# Patient Record
Sex: MALE
[De-identification: names, ages, dates, MRNs, and addresses within clinical notes are randomized per-mention and may not be internally consistent; named-entity substitution may affect disease eponyms.]

---

## 2020-06-30 PROBLEM — Z00.00 ENCOUNTER FOR PREVENTIVE HEALTH EXAMINATION: Status: ACTIVE | Noted: 2020-06-30

## 2020-07-01 ENCOUNTER — APPOINTMENT (OUTPATIENT)
Dept: NEUROLOGY | Facility: CLINIC | Age: 55
End: 2020-07-01
Payer: MEDICARE

## 2020-07-01 DIAGNOSIS — Z86.79 PERSONAL HISTORY OF OTHER DISEASES OF THE CIRCULATORY SYSTEM: ICD-10-CM

## 2020-07-01 DIAGNOSIS — G47.00 INSOMNIA, UNSPECIFIED: ICD-10-CM

## 2020-07-01 DIAGNOSIS — Z78.9 OTHER SPECIFIED HEALTH STATUS: ICD-10-CM

## 2020-07-01 DIAGNOSIS — Z99.2 DEPENDENCE ON RENAL DIALYSIS: ICD-10-CM

## 2020-07-01 DIAGNOSIS — Z87.448 PERSONAL HISTORY OF OTHER DISEASES OF URINARY SYSTEM: ICD-10-CM

## 2020-07-01 DIAGNOSIS — F84.0 AUTISTIC DISORDER: ICD-10-CM

## 2020-07-01 DIAGNOSIS — F20.9 SCHIZOPHRENIA, UNSPECIFIED: ICD-10-CM

## 2020-07-01 DIAGNOSIS — G25.81 RESTLESS LEGS SYNDROME: ICD-10-CM

## 2020-07-01 DIAGNOSIS — G47.33 OBSTRUCTIVE SLEEP APNEA (ADULT) (PEDIATRIC): ICD-10-CM

## 2020-07-01 PROCEDURE — 99204 OFFICE O/P NEW MOD 45 MIN: CPT | Mod: 95

## 2020-07-01 RX ORDER — AMLODIPINE BESYLATE 5 MG/1
5 TABLET ORAL DAILY
Refills: 0 | Status: ACTIVE | COMMUNITY
Start: 2020-07-01

## 2020-07-01 NOTE — DISCUSSION/SUMMARY
[FreeTextEntry1] : Mr. Crawley is a 54 year old man with autism spectrum disorder with chronic difficulties sleeping.\par \par Possible obstructive sleep apnea\par -Mr. Crawley has a history of snoring and daytime sleepiness.\par -Will check home sleep study.\par \par Restless Legs Syndrome\par -He has symptoms suggestive of RLS.\par -Check vitamin B12, ferritin, magnesium, calcium.\par -Can consider low dose gabapentin at bedtime.\par \par Insomnia\par We discussed the following recommendations to improve sleep hygiene and insomnia.\par - The bed should only be used for sleep and intimacy. No reading or watching television in bed.\par -  Avoid trying to sleep/go to bed only when sleepy. If you cannot fall asleep at the beginning of the night or in the middle of the night get out of bed after 15 minutes and do something relaxing (read, watch television, etc.)\par -  Wake up at the same time every day.\par -  No naps during the day\par -  No caffeine after noon \par -  Do not watch the clock at night.\par - Avoid direct sunlight late in the day/ wear sunglasses after 4 PM\par - Do not use the computer/tablets for 1-2 hours prior to bedtime\par - Schedule thinking time early in the evening and have relaxation time for one hour before bed\par - Practice relaxation training that can be done at night if you cannot sleep\par - Exercise for 20 minutes in the late afternoon/early evening or take a hot bath 2-4 hours before bedtime\par \par If not effective can try Trazodone 50 mg qhs.\par \par f/u after blood tests and home sleep study, sooner if needed.\par \par

## 2020-07-01 NOTE — REVIEW OF SYSTEMS
[Sleep Disturbances] : sleep disturbances [Anxiety] : anxiety [As Noted in HPI] : as noted in HPI [Negative] : Musculoskeletal [FreeTextEntry4] : snoring [FreeTextEntry5] : swelling of arms and legs [FreeTextEntry7] : reflux

## 2020-07-01 NOTE — CONSULT LETTER
[Dear  ___] : Dear  [unfilled], [Consult Letter:] : I had the pleasure of evaluating your patient, [unfilled]. [Please see my note below.] : Please see my note below. [Consult Closing:] : Thank you very much for allowing me to participate in the care of this patient.  If you have any questions, please do not hesitate to contact me. [FreeTextEntry2] : Elvin Mckenna [FreeTextEntry3] : Sincerely,\par \par \par Waleska Ling MD\par Diplomate, [employer] Neu[employer]\par Board Certified in the Subspecialty of Clinical Neurophysiology\par Board Certified in the Subspecialty of Sleep Medicine\par Board Certified in the Subspecialty of Epilepsy\par

## 2020-07-01 NOTE — PHYSICAL EXAM
[FreeTextEntry1] : Examination:\par Patient is well appearing.\par Dozing during exam.\par Neurological Examination:\par Mental Status: Awake, alert. Oriented to person, place, but not date\par Language: Some difficulty with comprehension, follows some simple commands\par Cranial Nerves:\par  EOMI, No facial weakness, tongue protrudes in the midline\par Motor Exam: No pronator drift. Barrel roll intact. Fine motor movements intact in hands\par Sensory: unable to examine\par Reflexes: Unable to examine\par Cerebellar: Finger to nose intact bilaterally\par \par \par

## 2020-07-01 NOTE — HISTORY OF PRESENT ILLNESS
[Home] : at home, [unfilled] , at the time of the visit. [Medical Office: ([facility])___] : at the medical office located in  [FreeTextEntry3] : Alaina Cordon, Sister [FreeTextEntry1] : \par This is a telehealth visit that was performed with the originating site at the patient’s home and the distant site of my office at [address] [place] [address].\par Two way audio and visual technology was used. \par Verbal consent to participate in the telephone/video visit was obtained in lieu of in-person signature due to the coronavirus emergency.\par This particular visit occurred during the 2020 COVID-19 emergency. I discussed with the patient the nature of our telehealth visits, that:\par -I would evaluate the patient and recommend diagnostics and treatments based on my assessment.\par -Our sessions are not being recorded.\par -The patient acknowledged the risk of unsecure transmission of his/her information.\par -Our team would provide follow up care in person if/when the patient needs it.\par \par Mr. Crawley is accompanied by his sister Alaina.\par She reports that he has had difficulty sleeping. \par He jerks around in his sleep and makes noises in his sleep.\par She thinks that medications may have played a role in his symptoms.\par \par She reports that the last psychiatrist that he used gave him an anti-anxiety medication which did not agree with him. \par \par He goes to bed at various times.\par Sometimes he falls asleep easily and other times he has difficulty sleeping.\par \par His family has witnessed loud snoring. \par His family has not noted pauses in his breathing during sleep.\par Sukhi denies waking up feeling like he is choking or gasping for air.\par He feels well rested when he initially wakes up.\par He does feel sleepy during the day.\par \par He sometimes feels restless when lying in bed or when sitting still for a prolonged period.\par He feels better when he moves.\par \par His sister notices some jerking in his sleep.\par \par He sometimes calls out as he is waking up.\par \par He is a dialysis patient and is supposed to have dialysis on Tuesdays, Thursdays and Saturdays.\par However, he does not like going and often refuses. He says that he has difficulty breathing with a mask on, but he would refuse even prior to the pandemic.\par \par His sister is not sure if his sleep is worse when he is overdue for dialysis.\par \par His [place] Sleepiness Scale Score is 9/24.

## 2020-07-06 ENCOUNTER — APPOINTMENT (OUTPATIENT)
Dept: NEUROLOGY | Facility: CLINIC | Age: 55
End: 2020-07-06